# Patient Record
Sex: MALE | Race: BLACK OR AFRICAN AMERICAN | NOT HISPANIC OR LATINO | Employment: STUDENT | URBAN - METROPOLITAN AREA
[De-identification: names, ages, dates, MRNs, and addresses within clinical notes are randomized per-mention and may not be internally consistent; named-entity substitution may affect disease eponyms.]

---

## 2021-08-09 ENCOUNTER — HOSPITAL ENCOUNTER (EMERGENCY)
Facility: HOSPITAL | Age: 21
Discharge: HOME | End: 2021-08-09
Attending: EMERGENCY MEDICINE
Payer: MEDICAID

## 2021-08-09 VITALS
BODY MASS INDEX: 28.79 KG/M2 | HEIGHT: 68 IN | RESPIRATION RATE: 16 BRPM | TEMPERATURE: 99 F | OXYGEN SATURATION: 99 % | HEART RATE: 63 BPM | DIASTOLIC BLOOD PRESSURE: 76 MMHG | WEIGHT: 190 LBS | SYSTOLIC BLOOD PRESSURE: 131 MMHG

## 2021-08-09 DIAGNOSIS — R07.9 CHEST PAIN: ICD-10-CM

## 2021-08-09 LAB
CTP QC/QA: YES
SARS-COV-2 RDRP RESP QL NAA+PROBE: NEGATIVE

## 2021-08-09 PROCEDURE — 99284 EMERGENCY DEPT VISIT MOD MDM: CPT | Mod: 25

## 2021-08-09 PROCEDURE — 99284 EMERGENCY DEPT VISIT MOD MDM: CPT | Mod: CS,,, | Performed by: EMERGENCY MEDICINE

## 2021-08-09 PROCEDURE — 99284 PR EMERGENCY DEPT VISIT,LEVEL IV: ICD-10-PCS | Mod: CS,,, | Performed by: EMERGENCY MEDICINE

## 2021-08-09 PROCEDURE — 93010 EKG 12-LEAD: ICD-10-PCS | Mod: ,,, | Performed by: INTERNAL MEDICINE

## 2021-08-09 PROCEDURE — 93010 ELECTROCARDIOGRAM REPORT: CPT | Mod: ,,, | Performed by: INTERNAL MEDICINE

## 2021-08-09 PROCEDURE — U0002 COVID-19 LAB TEST NON-CDC: HCPCS | Performed by: EMERGENCY MEDICINE

## 2021-08-09 PROCEDURE — 93005 ELECTROCARDIOGRAM TRACING: CPT

## 2021-08-09 RX ORDER — IBUPROFEN 600 MG/1
600 TABLET ORAL EVERY 6 HOURS PRN
Qty: 20 TABLET | Refills: 0 | Status: SHIPPED | OUTPATIENT
Start: 2021-08-09 | End: 2023-04-12

## 2021-12-17 ENCOUNTER — HOSPITAL ENCOUNTER (EMERGENCY)
Facility: HOSPITAL | Age: 21
Discharge: HOME | End: 2021-12-17
Attending: EMERGENCY MEDICINE
Payer: MEDICAID

## 2021-12-17 VITALS
SYSTOLIC BLOOD PRESSURE: 112 MMHG | OXYGEN SATURATION: 98 % | HEART RATE: 59 BPM | WEIGHT: 190 LBS | TEMPERATURE: 99 F | DIASTOLIC BLOOD PRESSURE: 58 MMHG | BODY MASS INDEX: 28.89 KG/M2 | RESPIRATION RATE: 18 BRPM

## 2021-12-17 DIAGNOSIS — R55 SYNCOPE: ICD-10-CM

## 2021-12-17 DIAGNOSIS — R55 VASOVAGAL SYNCOPE: Primary | ICD-10-CM

## 2021-12-17 LAB
ALBUMIN SERPL BCP-MCNC: 3.9 G/DL (ref 3.5–5.2)
ALP SERPL-CCNC: 60 U/L (ref 55–135)
ALT SERPL W/O P-5'-P-CCNC: 8 U/L (ref 10–44)
ANION GAP SERPL CALC-SCNC: 7 MMOL/L (ref 8–16)
AST SERPL-CCNC: 12 U/L (ref 10–40)
BASOPHILS # BLD AUTO: 0.03 K/UL (ref 0–0.2)
BASOPHILS NFR BLD: 0.7 % (ref 0–1.9)
BILIRUB SERPL-MCNC: 0.7 MG/DL (ref 0.1–1)
BUN SERPL-MCNC: 11 MG/DL (ref 6–20)
CALCIUM SERPL-MCNC: 8.7 MG/DL (ref 8.7–10.5)
CHLORIDE SERPL-SCNC: 105 MMOL/L (ref 95–110)
CO2 SERPL-SCNC: 25 MMOL/L (ref 23–29)
CREAT SERPL-MCNC: 0.9 MG/DL (ref 0.5–1.4)
DIFFERENTIAL METHOD: ABNORMAL
EOSINOPHIL # BLD AUTO: 0.1 K/UL (ref 0–0.5)
EOSINOPHIL NFR BLD: 1.8 % (ref 0–8)
ERYTHROCYTE [DISTWIDTH] IN BLOOD BY AUTOMATED COUNT: 11.7 % (ref 11.5–14.5)
EST. GFR  (AFRICAN AMERICAN): >60 ML/MIN/1.73 M^2
EST. GFR  (NON AFRICAN AMERICAN): >60 ML/MIN/1.73 M^2
GLUCOSE SERPL-MCNC: 82 MG/DL (ref 70–110)
HCT VFR BLD AUTO: 40.3 % (ref 40–54)
HGB BLD-MCNC: 13.6 G/DL (ref 14–18)
IMM GRANULOCYTES # BLD AUTO: 0.01 K/UL (ref 0–0.04)
IMM GRANULOCYTES NFR BLD AUTO: 0.2 % (ref 0–0.5)
LYMPHOCYTES # BLD AUTO: 0.8 K/UL (ref 1–4.8)
LYMPHOCYTES NFR BLD: 17.1 % (ref 18–48)
MCH RBC QN AUTO: 30.4 PG (ref 27–31)
MCHC RBC AUTO-ENTMCNC: 33.7 G/DL (ref 32–36)
MCV RBC AUTO: 90 FL (ref 82–98)
MONOCYTES # BLD AUTO: 0.3 K/UL (ref 0.3–1)
MONOCYTES NFR BLD: 6.8 % (ref 4–15)
NEUTROPHILS # BLD AUTO: 3.2 K/UL (ref 1.8–7.7)
NEUTROPHILS NFR BLD: 73.4 % (ref 38–73)
NRBC BLD-RTO: 0 /100 WBC
PLATELET # BLD AUTO: 165 K/UL (ref 150–450)
PMV BLD AUTO: 11.9 FL (ref 9.2–12.9)
POTASSIUM SERPL-SCNC: 4 MMOL/L (ref 3.5–5.1)
PROT SERPL-MCNC: 6.7 G/DL (ref 6–8.4)
RBC # BLD AUTO: 4.48 M/UL (ref 4.6–6.2)
SODIUM SERPL-SCNC: 137 MMOL/L (ref 136–145)
TROPONIN I SERPL DL<=0.01 NG/ML-MCNC: <0.006 NG/ML (ref 0–0.03)
WBC # BLD AUTO: 4.38 K/UL (ref 3.9–12.7)

## 2021-12-17 PROCEDURE — 87389 HIV-1 AG W/HIV-1&-2 AB AG IA: CPT | Performed by: EMERGENCY MEDICINE

## 2021-12-17 PROCEDURE — 93010 EKG 12-LEAD: ICD-10-PCS | Mod: ,,, | Performed by: INTERNAL MEDICINE

## 2021-12-17 PROCEDURE — 93005 ELECTROCARDIOGRAM TRACING: CPT

## 2021-12-17 PROCEDURE — 93010 ELECTROCARDIOGRAM REPORT: CPT | Mod: ,,, | Performed by: INTERNAL MEDICINE

## 2021-12-17 PROCEDURE — 80053 COMPREHEN METABOLIC PANEL: CPT | Performed by: STUDENT IN AN ORGANIZED HEALTH CARE EDUCATION/TRAINING PROGRAM

## 2021-12-17 PROCEDURE — 85025 COMPLETE CBC W/AUTO DIFF WBC: CPT | Performed by: STUDENT IN AN ORGANIZED HEALTH CARE EDUCATION/TRAINING PROGRAM

## 2021-12-17 PROCEDURE — 99285 EMERGENCY DEPT VISIT HI MDM: CPT | Mod: 25

## 2021-12-17 PROCEDURE — 84484 ASSAY OF TROPONIN QUANT: CPT | Performed by: STUDENT IN AN ORGANIZED HEALTH CARE EDUCATION/TRAINING PROGRAM

## 2021-12-17 PROCEDURE — 99284 PR EMERGENCY DEPT VISIT,LEVEL IV: ICD-10-PCS | Mod: ,,, | Performed by: EMERGENCY MEDICINE

## 2021-12-17 PROCEDURE — 99284 EMERGENCY DEPT VISIT MOD MDM: CPT | Mod: ,,, | Performed by: EMERGENCY MEDICINE

## 2021-12-17 PROCEDURE — 86803 HEPATITIS C AB TEST: CPT | Performed by: EMERGENCY MEDICINE

## 2021-12-18 ENCOUNTER — TELEPHONE (OUTPATIENT)
Dept: EMERGENCY MEDICINE | Facility: HOSPITAL | Age: 21
End: 2021-12-18
Payer: MEDICAID

## 2021-12-18 DIAGNOSIS — R55 SYNCOPE, UNSPECIFIED SYNCOPE TYPE: Primary | ICD-10-CM

## 2021-12-20 LAB
HCV AB SERPL QL IA: NEGATIVE
HIV 1+2 AB+HIV1 P24 AG SERPL QL IA: NEGATIVE

## 2022-02-14 ENCOUNTER — OFFICE VISIT (OUTPATIENT)
Dept: CARDIOLOGY | Facility: CLINIC | Age: 22
End: 2022-02-14
Payer: MEDICAID

## 2022-02-14 VITALS
HEART RATE: 76 BPM | BODY MASS INDEX: 27.17 KG/M2 | DIASTOLIC BLOOD PRESSURE: 72 MMHG | HEIGHT: 68 IN | SYSTOLIC BLOOD PRESSURE: 126 MMHG | OXYGEN SATURATION: 99 % | WEIGHT: 179.25 LBS

## 2022-02-14 DIAGNOSIS — R55 VASOVAGAL SYNCOPE: Primary | ICD-10-CM

## 2022-02-14 DIAGNOSIS — R55 SYNCOPE, UNSPECIFIED SYNCOPE TYPE: ICD-10-CM

## 2022-02-14 PROCEDURE — 3074F SYST BP LT 130 MM HG: CPT | Mod: CPTII,,, | Performed by: INTERNAL MEDICINE

## 2022-02-14 PROCEDURE — 99999 PR PBB SHADOW E&M-EST. PATIENT-LVL III: ICD-10-PCS | Mod: PBBFAC,,, | Performed by: INTERNAL MEDICINE

## 2022-02-14 PROCEDURE — 1159F MED LIST DOCD IN RCRD: CPT | Mod: CPTII,,, | Performed by: INTERNAL MEDICINE

## 2022-02-14 PROCEDURE — 1160F RVW MEDS BY RX/DR IN RCRD: CPT | Mod: CPTII,,, | Performed by: INTERNAL MEDICINE

## 2022-02-14 PROCEDURE — 93005 ELECTROCARDIOGRAM TRACING: CPT | Mod: PBBFAC,PN | Performed by: INTERNAL MEDICINE

## 2022-02-14 PROCEDURE — 3074F PR MOST RECENT SYSTOLIC BLOOD PRESSURE < 130 MM HG: ICD-10-PCS | Mod: CPTII,,, | Performed by: INTERNAL MEDICINE

## 2022-02-14 PROCEDURE — 99205 OFFICE O/P NEW HI 60 MIN: CPT | Mod: S$PBB,25,, | Performed by: INTERNAL MEDICINE

## 2022-02-14 PROCEDURE — 99213 OFFICE O/P EST LOW 20 MIN: CPT | Mod: PBBFAC,PN | Performed by: INTERNAL MEDICINE

## 2022-02-14 PROCEDURE — 3008F BODY MASS INDEX DOCD: CPT | Mod: CPTII,,, | Performed by: INTERNAL MEDICINE

## 2022-02-14 PROCEDURE — 99999 PR PBB SHADOW E&M-EST. PATIENT-LVL III: CPT | Mod: PBBFAC,,, | Performed by: INTERNAL MEDICINE

## 2022-02-14 PROCEDURE — 93010 ELECTROCARDIOGRAM REPORT: CPT | Mod: S$PBB,,, | Performed by: INTERNAL MEDICINE

## 2022-02-14 PROCEDURE — 1160F PR REVIEW ALL MEDS BY PRESCRIBER/CLIN PHARMACIST DOCUMENTED: ICD-10-PCS | Mod: CPTII,,, | Performed by: INTERNAL MEDICINE

## 2022-02-14 PROCEDURE — 99205 PR OFFICE/OUTPT VISIT, NEW, LEVL V, 60-74 MIN: ICD-10-PCS | Mod: S$PBB,25,, | Performed by: INTERNAL MEDICINE

## 2022-02-14 PROCEDURE — 3008F PR BODY MASS INDEX (BMI) DOCUMENTED: ICD-10-PCS | Mod: CPTII,,, | Performed by: INTERNAL MEDICINE

## 2022-02-14 PROCEDURE — 3078F DIAST BP <80 MM HG: CPT | Mod: CPTII,,, | Performed by: INTERNAL MEDICINE

## 2022-02-14 PROCEDURE — 93010 EKG 12-LEAD: ICD-10-PCS | Mod: S$PBB,,, | Performed by: INTERNAL MEDICINE

## 2022-02-14 PROCEDURE — 1159F PR MEDICATION LIST DOCUMENTED IN MEDICAL RECORD: ICD-10-PCS | Mod: CPTII,,, | Performed by: INTERNAL MEDICINE

## 2022-02-14 PROCEDURE — 3078F PR MOST RECENT DIASTOLIC BLOOD PRESSURE < 80 MM HG: ICD-10-PCS | Mod: CPTII,,, | Performed by: INTERNAL MEDICINE

## 2022-02-14 NOTE — PROGRESS NOTES
"  Subjective:      Patient ID: Chelsea Pack II is a 21 y.o. male.    Chief Complaint: Loss of Consciousness (ER referral)    HPI:  Pt felt weak while sitting in his room smoking.  Pt arose to let his father know that he was feeling bad and collapsed unconscious.  After waking up pt arose again and fainted again.  EMS did a fingerstick blood test and pt fainted again.  "My blood pressure was very low"    Pt fainted another time about a year ago after cutting his finger while cooking.    Pt is generally active.    When he plays basketball he feels a little short of breath.    Pt works as a .    Pt also fainted once when he was stuck with accupuncture needles      Review of Systems   Cardiovascular: Positive for dyspnea on exertion and syncope. Negative for chest pain, claudication, irregular heartbeat, leg swelling, near-syncope, orthopnea and palpitations.        Past Medical History:   Diagnosis Date    Asthma     Syncope and collapse         Past Surgical History:   Procedure Laterality Date    knee tendon surgery         Family History   Problem Relation Age of Onset    No Known Problems Mother     No Known Problems Father        Social History     Socioeconomic History    Marital status: Single   Tobacco Use    Smoking status: Never Smoker    Smokeless tobacco: Never Used   Substance and Sexual Activity    Alcohol use: No    Drug use: No       Current Outpatient Medications on File Prior to Visit   Medication Sig Dispense Refill    ibuprofen (ADVIL,MOTRIN) 600 MG tablet Take 1 tablet (600 mg total) by mouth every 6 (six) hours as needed for Pain. 20 tablet 0     No current facility-administered medications on file prior to visit.       Review of patient's allergies indicates:   Allergen Reactions    Amoxicillin Swelling     Objective:     Vitals:    02/14/22 1026   BP: 126/72   BP Location: Left arm   Patient Position: Sitting   BP Method: Large (Automatic)   Pulse: 76   SpO2: 99%   Weight: " "81.3 kg (179 lb 3.7 oz)   Height: 5' 8" (1.727 m)        Physical Exam  Constitutional:       General: He is not in acute distress.     Appearance: He is well-developed and well-nourished. He is not diaphoretic.   Eyes:      General: No scleral icterus.  Neck:      Vascular: No carotid bruit or JVD.   Cardiovascular:      Rate and Rhythm: Regular rhythm.      Pulses:           Dorsalis pedis pulses are 2+ on the right side and 2+ on the left side.      Heart sounds: Normal heart sounds. No murmur heard.  No friction rub. No gallop.    Pulmonary:      Effort: Pulmonary effort is normal. No respiratory distress.      Breath sounds: Normal breath sounds.   Abdominal:      Palpations: Abdomen is soft. There is no hepatomegaly, splenomegaly, mass or pulsatile mass.   Musculoskeletal:         General: No edema.      Right lower leg: No edema.      Left lower leg: No edema.   Skin:     General: Skin is warm and dry.   Neurological:      Mental Status: He is alert and oriented to person, place, and time.   Psychiatric:         Mood and Affect: Mood and affect normal.         Behavior: Behavior normal.         Thought Content: Thought content normal.         Judgment: Judgment normal.              ECG today reviewed by me:  GIANNA SAMSON    Admission on 12/17/2021, Discharged on 12/17/2021   Component Date Value Ref Range Status    HIV 1/2 Ag/Ab 12/17/2021 Negative  Negative Final    Hepatitis C Ab 12/17/2021 Negative  Negative Final    WBC 12/17/2021 4.38  3.90 - 12.70 K/uL Final    RBC 12/17/2021 4.48* 4.60 - 6.20 M/uL Final    Hemoglobin 12/17/2021 13.6* 14.0 - 18.0 g/dL Final    Hematocrit 12/17/2021 40.3  40.0 - 54.0 % Final    MCV 12/17/2021 90  82 - 98 fL Final    MCH 12/17/2021 30.4  27.0 - 31.0 pg Final    MCHC 12/17/2021 33.7  32.0 - 36.0 g/dL Final    RDW 12/17/2021 11.7  11.5 - 14.5 % Final    Platelets 12/17/2021 165  150 - 450 K/uL Final    MPV 12/17/2021 11.9  9.2 - 12.9 fL Final    Immature " Granulocytes 12/17/2021 0.2  0.0 - 0.5 % Final    Gran # (ANC) 12/17/2021 3.2  1.8 - 7.7 K/uL Final    Immature Grans (Abs) 12/17/2021 0.01  0.00 - 0.04 K/uL Final    Lymph # 12/17/2021 0.8* 1.0 - 4.8 K/uL Final    Mono # 12/17/2021 0.3  0.3 - 1.0 K/uL Final    Eos # 12/17/2021 0.1  0.0 - 0.5 K/uL Final    Baso # 12/17/2021 0.03  0.00 - 0.20 K/uL Final    nRBC 12/17/2021 0  0 /100 WBC Final    Gran % 12/17/2021 73.4* 38.0 - 73.0 % Final    Lymph % 12/17/2021 17.1* 18.0 - 48.0 % Final    Mono % 12/17/2021 6.8  4.0 - 15.0 % Final    Eosinophil % 12/17/2021 1.8  0.0 - 8.0 % Final    Basophil % 12/17/2021 0.7  0.0 - 1.9 % Final    Differential Method 12/17/2021 Automated   Final    Sodium 12/17/2021 137  136 - 145 mmol/L Final    Potassium 12/17/2021 4.0  3.5 - 5.1 mmol/L Final    Chloride 12/17/2021 105  95 - 110 mmol/L Final    CO2 12/17/2021 25  23 - 29 mmol/L Final    Glucose 12/17/2021 82  70 - 110 mg/dL Final    BUN 12/17/2021 11  6 - 20 mg/dL Final    Creatinine 12/17/2021 0.9  0.5 - 1.4 mg/dL Final    Calcium 12/17/2021 8.7  8.7 - 10.5 mg/dL Final    Total Protein 12/17/2021 6.7  6.0 - 8.4 g/dL Final    Albumin 12/17/2021 3.9  3.5 - 5.2 g/dL Final    Total Bilirubin 12/17/2021 0.7  0.1 - 1.0 mg/dL Final    Alkaline Phosphatase 12/17/2021 60  55 - 135 U/L Final    AST 12/17/2021 12  10 - 40 U/L Final    ALT 12/17/2021 8* 10 - 44 U/L Final    Anion Gap 12/17/2021 7* 8 - 16 mmol/L Final    eGFR if African American 12/17/2021 >60.0  >60 mL/min/1.73 m^2 Final    eGFR if non African American 12/17/2021 >60.0  >60 mL/min/1.73 m^2 Final    Troponin I 12/17/2021 <0.006  0.000 - 0.026 ng/mL Final   (        Order: [id number]   Status: Final result     Visible to patient: No (inaccessible in Patient Portal)     Next appt: None     Dx: Chest pain     0 Result Notes           Narrative  Performed by: GEMUSE  Test Reason : R07.9,     Vent. Rate : 063 BPM     Atrial Rate : 063 BPM      P-R Int :  164 ms          QRS Dur : 106 ms       QT Int : 374 ms       P-R-T Axes : 078 056 049 degrees      QTc Int : 382 ms     Normal sinus rhythm   Normal ECG   No previous ECGs available   Confirmed by RAMONITA NEWMAN MD ([id number]) on 8/10/2021 2:24:55 PM     Referred By: MARIOLA    SELF           Confirmed By:RAMONITA NEWMAN MD      Specimen Collected: 08/09/21 21:05 Last Resulted: 08/10/21 14:24                 Show images for EKG 12-lead      EKG 12-lead  Order: [id number]  · Status: Final result   · Visible to patient: No (inaccessible in Patient Portal)   · Next appt: None   · Dx: Syncope    · 0 Result Notes           Narrative  Performed by: GEMUSE  Test Reason : R55,     Vent. Rate : 092 BPM     Atrial Rate : 092 BPM      P-R Int : 200 ms          QRS Dur : 104 ms       QT Int : 326 ms       P-R-T Axes : 076 024 044 degrees      QTc Int : 403 ms     Normal sinus rhythm   Right ventricular conduction delay   Borderline Abnormal ECG   When compared with ECG of 09-AUG-2021 21:05,   RSR' pattern in V1 is now Present   Confirmed by Zach Sullivan MD ([id number]) on 12/18/2021 8:50:59 AM     Referred By: MARIOLA    SELF           Confirmed By:Zach Sullivan MD      Specimen Collected: 12/17/21 15:27           · inaccessible in Patient Portal)   · Next appt: None   · 0 Result Notes    Details    Reading Physician Reading Date Result Priority   Stefan Elizabeth MD  [phone]  [phone] 12/17/2021 STAT     Narrative & Impression  EXAMINATION:  XR CHEST AP PORTABLE     CLINICAL HISTORY:  syncope workup, abnormal ekg;     TECHNIQUE:  Single frontal view of the chest was performed.     COMPARISON:  08/09/2021     FINDINGS:  The lungs are clear, with normal appearance of pulmonary vasculature and no pleural effusion or pneumothorax.     The cardiac silhouette is normal in size. The hilar and mediastinal contours are unremarkable.     Bones are intact.     Impression:     No acute abnormality.        Electronically signed by: Stefan  Gema  Date:                                            12/17/2021  Time:                                           18:10             Exam Ended: 12/17/21 17:30 Last Resulted: 12/17/21 18:10                     Assessment:     1. Vasovagal syncope    2. Syncope, unspecified syncope type      Plan:   Chelsea was seen today for loss of consciousness.    Diagnoses and all orders for this visit:    Vasovagal syncope    Syncope, unspecified syncope type  -     Ambulatory referral/consult to Cardiology  -     IN OFFICE EKG 12-LEAD (to Muse)  -     Holter monitor - 48 hour; Future  -     Echo Saline Bubble? No; Future     Pt has vasovagal syncope.    Pt and mother reassured.  Further reassured that there is no evidence of Brugada syndrome which was a question raised by ER MD.    Will obtain a 48 hour holter and echocardiogram    Pt cautioned to lie down if he feel weak and to arise very slowly after he feels better..    I instructed pt to inform phlebotomist of his tendency to faint and to lie supine if necessary.    RTC prn    No follow-ups on file.

## 2022-03-03 ENCOUNTER — TELEPHONE (OUTPATIENT)
Dept: CARDIOLOGY | Facility: CLINIC | Age: 22
End: 2022-03-03
Payer: MEDICAID

## 2022-03-08 ENCOUNTER — TELEPHONE (OUTPATIENT)
Dept: CARDIOLOGY | Facility: CLINIC | Age: 22
End: 2022-03-08
Payer: MEDICAID

## 2022-09-10 ENCOUNTER — HOSPITAL ENCOUNTER (EMERGENCY)
Facility: HOSPITAL | Age: 22
Discharge: HOME | End: 2022-09-10
Attending: EMERGENCY MEDICINE
Payer: MEDICAID

## 2022-09-10 VITALS
BODY MASS INDEX: 27.17 KG/M2 | TEMPERATURE: 98 F | OXYGEN SATURATION: 100 % | HEIGHT: 68 IN | HEART RATE: 64 BPM | RESPIRATION RATE: 16 BRPM | DIASTOLIC BLOOD PRESSURE: 75 MMHG | WEIGHT: 179.25 LBS | SYSTOLIC BLOOD PRESSURE: 132 MMHG

## 2022-09-10 DIAGNOSIS — R55 VASOVAGAL SYNCOPE: Primary | ICD-10-CM

## 2022-09-10 DIAGNOSIS — M25.561 RIGHT KNEE PAIN: ICD-10-CM

## 2022-09-10 LAB
ALBUMIN SERPL BCP-MCNC: 4.3 G/DL (ref 3.5–5.2)
ALP SERPL-CCNC: 65 U/L (ref 55–135)
ALT SERPL W/O P-5'-P-CCNC: 24 U/L (ref 10–44)
ANION GAP SERPL CALC-SCNC: 9 MMOL/L (ref 8–16)
AST SERPL-CCNC: 23 U/L (ref 10–40)
BASOPHILS # BLD AUTO: 0.03 K/UL (ref 0–0.2)
BASOPHILS NFR BLD: 0.7 % (ref 0–1.9)
BILIRUB SERPL-MCNC: 0.6 MG/DL (ref 0.1–1)
BUN SERPL-MCNC: 10 MG/DL (ref 6–20)
CALCIUM SERPL-MCNC: 9.4 MG/DL (ref 8.7–10.5)
CHLORIDE SERPL-SCNC: 104 MMOL/L (ref 95–110)
CO2 SERPL-SCNC: 26 MMOL/L (ref 23–29)
CREAT SERPL-MCNC: 1 MG/DL (ref 0.5–1.4)
DIFFERENTIAL METHOD: ABNORMAL
EOSINOPHIL # BLD AUTO: 0.1 K/UL (ref 0–0.5)
EOSINOPHIL NFR BLD: 3.4 % (ref 0–8)
ERYTHROCYTE [DISTWIDTH] IN BLOOD BY AUTOMATED COUNT: 12.1 % (ref 11.5–14.5)
EST. GFR  (NO RACE VARIABLE): >60 ML/MIN/1.73 M^2
GLUCOSE SERPL-MCNC: 85 MG/DL (ref 70–110)
HCT VFR BLD AUTO: 39.6 % (ref 40–54)
HGB BLD-MCNC: 13.9 G/DL (ref 14–18)
IMM GRANULOCYTES # BLD AUTO: 0.02 K/UL (ref 0–0.04)
IMM GRANULOCYTES NFR BLD AUTO: 0.5 % (ref 0–0.5)
LYMPHOCYTES # BLD AUTO: 0.7 K/UL (ref 1–4.8)
LYMPHOCYTES NFR BLD: 17.8 % (ref 18–48)
MCH RBC QN AUTO: 31 PG (ref 27–31)
MCHC RBC AUTO-ENTMCNC: 35.1 G/DL (ref 32–36)
MCV RBC AUTO: 88 FL (ref 82–98)
MONOCYTES # BLD AUTO: 0.5 K/UL (ref 0.3–1)
MONOCYTES NFR BLD: 11.2 % (ref 4–15)
NEUTROPHILS # BLD AUTO: 2.7 K/UL (ref 1.8–7.7)
NEUTROPHILS NFR BLD: 66.4 % (ref 38–73)
NRBC BLD-RTO: 0 /100 WBC
PLATELET # BLD AUTO: 164 K/UL (ref 150–450)
PMV BLD AUTO: 11.4 FL (ref 9.2–12.9)
POCT GLUCOSE: 83 MG/DL (ref 70–110)
POTASSIUM SERPL-SCNC: 4.3 MMOL/L (ref 3.5–5.1)
PROT SERPL-MCNC: 7.1 G/DL (ref 6–8.4)
RBC # BLD AUTO: 4.49 M/UL (ref 4.6–6.2)
SODIUM SERPL-SCNC: 139 MMOL/L (ref 136–145)
TSH SERPL DL<=0.005 MIU/L-ACNC: 1.07 UIU/ML (ref 0.4–4)
WBC # BLD AUTO: 4.1 K/UL (ref 3.9–12.7)

## 2022-09-10 PROCEDURE — 99284 EMERGENCY DEPT VISIT MOD MDM: CPT | Mod: ,,, | Performed by: EMERGENCY MEDICINE

## 2022-09-10 PROCEDURE — 63600175 PHARM REV CODE 636 W HCPCS: Performed by: STUDENT IN AN ORGANIZED HEALTH CARE EDUCATION/TRAINING PROGRAM

## 2022-09-10 PROCEDURE — 93010 EKG 12-LEAD: ICD-10-PCS | Mod: ,,, | Performed by: INTERNAL MEDICINE

## 2022-09-10 PROCEDURE — 99284 PR EMERGENCY DEPT VISIT,LEVEL IV: ICD-10-PCS | Mod: ,,, | Performed by: EMERGENCY MEDICINE

## 2022-09-10 PROCEDURE — 93010 ELECTROCARDIOGRAM REPORT: CPT | Mod: ,,, | Performed by: INTERNAL MEDICINE

## 2022-09-10 PROCEDURE — 84443 ASSAY THYROID STIM HORMONE: CPT | Performed by: STUDENT IN AN ORGANIZED HEALTH CARE EDUCATION/TRAINING PROGRAM

## 2022-09-10 PROCEDURE — 96360 HYDRATION IV INFUSION INIT: CPT

## 2022-09-10 PROCEDURE — 80053 COMPREHEN METABOLIC PANEL: CPT | Performed by: STUDENT IN AN ORGANIZED HEALTH CARE EDUCATION/TRAINING PROGRAM

## 2022-09-10 PROCEDURE — 93005 ELECTROCARDIOGRAM TRACING: CPT

## 2022-09-10 PROCEDURE — 99285 EMERGENCY DEPT VISIT HI MDM: CPT | Mod: 25

## 2022-09-10 PROCEDURE — 85025 COMPLETE CBC W/AUTO DIFF WBC: CPT | Performed by: STUDENT IN AN ORGANIZED HEALTH CARE EDUCATION/TRAINING PROGRAM

## 2022-09-10 PROCEDURE — 82962 GLUCOSE BLOOD TEST: CPT

## 2022-09-10 RX ADMIN — SODIUM CHLORIDE, SODIUM LACTATE, POTASSIUM CHLORIDE, AND CALCIUM CHLORIDE 1000 ML: .6; .31; .03; .02 INJECTION, SOLUTION INTRAVENOUS at 02:09

## 2022-09-10 NOTE — ED PROVIDER NOTES
Encounter Date: 9/10/2022       History     Chief Complaint   Patient presents with    Loss of Consciousness     Patient is a 21 y/o M w/ PMHx of vasovagal syncope presents for a recurrent syncopal event today.  Endorses prodromal symptoms, including nausea, diaphoresis, tingling/numbness of bilateral hands and tunnel vision.  The syncopal event comes 1 hour after he smoked marijuana.  This event was witnessed by his father, who denied any head trauma or seizure-like activity. Unclear how long he was unconscious for.  Denied confusion afterwards. Denied chest pain, SOB, current N/V/, abdominal pain, headaches, or changes in vision.    Review of patient's allergies indicates:   Allergen Reactions    Amoxicillin Swelling     Past Medical History:   Diagnosis Date    Asthma     Syncope and collapse      Past Surgical History:   Procedure Laterality Date    knee tendon surgery       Family History   Problem Relation Age of Onset    No Known Problems Mother     No Known Problems Father      Social History     Tobacco Use    Smoking status: Never    Smokeless tobacco: Never   Substance Use Topics    Alcohol use: No    Drug use: No     Review of Systems   Constitutional:  Positive for diaphoresis (now resolved). Negative for activity change, chills and fever.   HENT:  Negative for congestion, ear pain and sore throat.    Respiratory:  Negative for shortness of breath and stridor.    Cardiovascular:  Negative for chest pain and palpitations.   Gastrointestinal:  Positive for nausea (now resolved) and vomiting (now resolved). Negative for abdominal pain.   Genitourinary:  Negative for dysuria and urgency.   Musculoskeletal:  Negative for back pain.   Skin:  Negative for rash.   Neurological:  Positive for syncope. Negative for dizziness, weakness and headaches.   Hematological:  Does not bruise/bleed easily.     Physical Exam     Initial Vitals   BP Pulse Resp Temp SpO2   09/10/22 1252 09/10/22 1252 09/10/22 1252 09/10/22  1252 09/10/22 1317   120/86 (!) 56 14 97.8 °F (36.6 °C) 100 %      MAP       --                Physical Exam    Nursing note and vitals reviewed.  Constitutional: He appears well-developed and well-nourished. He is not diaphoretic. No distress.   Well-appearing.  Speaking full sentences.  No acute distress.   HENT:   Head: Normocephalic and atraumatic.   Right Ear: External ear normal.   Left Ear: External ear normal.   Neck: Neck supple.   Cardiovascular:  Normal rate, regular rhythm, S1 normal, S2 normal, normal heart sounds and intact distal pulses.           No murmur heard.  Pulmonary/Chest: Breath sounds normal. No respiratory distress. He has no wheezes. He has no rhonchi. He has no rales.   Abdominal: Abdomen is soft. He exhibits no distension. There is no abdominal tenderness. There is no rebound and no guarding.   Musculoskeletal:      Cervical back: Neck supple.      Comments: F ROM to right knee.  RLE is neurovascularly intact.  No obvious bony deformities.     Neurological: He is alert and oriented to person, place, and time. GCS score is 15. GCS eye subscore is 4. GCS verbal subscore is 5. GCS motor subscore is 6.   Intact motor and sensation to upper and lower extremities bilaterally.   Skin: Skin is warm. Capillary refill takes less than 2 seconds. No rash noted.   Psychiatric: He has a normal mood and affect.       ED Course   Procedures  Labs Reviewed   CBC W/ AUTO DIFFERENTIAL - Abnormal; Notable for the following components:       Result Value    RBC 4.49 (*)     Hemoglobin 13.9 (*)     Hematocrit 39.6 (*)     Lymph # 0.7 (*)     Lymph % 17.8 (*)     All other components within normal limits   COMPREHENSIVE METABOLIC PANEL    Narrative:     Add on TSH Per Dr Marta Castillo Order#[id number] on 9/10/22 @1351   TSH   TSH    Narrative:     Add on TSH Per Dr Marta Castillo Order#[id number] on 9/10/22 @1351   POCT GLUCOSE MONITORING CONTINUOUS     EKG Readings: (Independently Interpreted)   Initial  "Reading: No STEMI. Previous EKG: Compared with most recent EKG Previous EKG Date: 02/14/2022. Rhythm: Sinus Bradycardia. Heart Rate: 58. Ectopy: No Ectopy. Conduction: 1st Degree AV Block.   Isolated T-wave inversion in lead 3     Imaging Results              X-Ray Knee 1 or 2 View Right (Final result)  Result time 09/10/22 14:58:55      Final result by Berna Mayo MD (09/10/22 14:58:55)                   Impression:      1. Patellar enthesophyte at the patellar tendon insertion site which appears fragmented, a change from the 2016 exam.  This is most likely related to history of prior patellar tendon surgery described in the 07/22/2016 operative note.  However acute avulsion injury cannot be excluded.  Correlate with trauma history.  2. Stable osteochondroma of the proximal fibula.      Electronically signed by: Berna Mayo MD  Date:    09/10/2022  Time:    14:58               Narrative:    EXAMINATION:  XR KNEE 1 OR 2 VIEW RIGHT    CLINICAL HISTORY:  Pain in right knee    TECHNIQUE:  AP and lateral views of the right knee were performed.    COMPARISON:  Prior dated 05/23/2016    FINDINGS:  There is no dislocation, or bone destruction.  There is a stable appearing osteochondroma of the proximal fibular metaphysis.  There is patellar enthesophyte formation at the patellar tendon insertion site.  This appears fragmented.                                       Medications   lactated ringers bolus 1,000 mL (0 mLs Intravenous Stopped 9/10/22 1510)     Medical Decision Making:   History:   Old Medical Records: I decided to obtain old medical records.  Old Records Summarized: records from clinic visits and records from previous admission(s).       <> Summary of Records: 12/2021: Seen in ED for v/v syncope: " Patient reports he was smoking marijuana when he became lightheaded, dizzy and subsequently passed out.  The episode was witnessed by his father.  The patient did not fall or hit his head.  His father " "called EMS and upon arrival they noted heart rate to be in the 40s, administered 1 mg of atropine."    Seen by Cards for f/u: "Pt and mother reassured.  Further reassured that there is no evidence of Brugada syndrome which was a question raised by ER MD. Will obtain a 48 hour holter and echocardiogram"    2/2022: Echo neg  3/2022: Holter neg  Initial Assessment:   Emergent evaluation of a syncopal event. He is afebrile and hemodynamically stable.  Differential Diagnosis:   Vasovagal syncope, unlikely seizure, electrolyte abnormality, dehydration/orthostasis  Clinical Tests:   Lab Tests: Ordered and Reviewed  Radiological Study: Ordered and Reviewed  Medical Tests: Ordered and Reviewed  ED Management:  Labs are unremarkable.  EKG with isolated T-wave inversion, discussed with cardiology--no concern at this time for cardiogenic etiology. Given 1 L IVF. XR of right knee done per patient's request. Per official read, there is a patellar enthesophyte that appears fragmented-- however patient is currently asymptomatic and ambulatory in ED without difficulty. Placed ambulatory referral to Orthopedic surgery.  Discussed ED return precautions, and he expressed understanding.          Attending Attestation:   Physician Attestation Statement for Resident:  As the supervising MD   Physician Attestation Statement: I have personally seen and examined this patient.   I agree with the above history.  -:   As the supervising MD I agree with the above PE.   -: NAD, NCAT, A&Ox3, PERRL and EOMI, neck supple/normal ROM, CTAB, RRR no mrg, normal extremity ROM/m/s, abd s/nt/nd, no LE edema, skin dry and warm     As the supervising MD I agree with the above treatment, course, plan, and disposition.   -: Pt well-appearing, endorses prodromal symptoms after becoming lightheaded shortly after smoking marijuana then standing up, no reported trauma. Do not suspect arrhythmia given prodrome, no seizure activity witnessed by father nor tongue " lac/incontinence, but will obtain basic labs.    I have reviewed and agree with the residents interpretation of the following: lab data and EKG.  I have reviewed the following: old records at this facility.                       Clinical Impression:   Final diagnoses:  [M25.561] Right knee pain  [R55] Vasovagal syncope (Primary)        ED Disposition Condition    Discharge Stable          ED Prescriptions    None       Follow-up Information    None          Norman Thorpe MD  Resident  09/10/22 1531       Marta Castillo MD  09/11/22 1022

## 2022-09-10 NOTE — ED NOTES
Pt c/o passing out after getting out of bed. Pt states he was lying in bed watching tv, felt as if he was going to pass out, and got up to go tell his dad. Pt states he then passed out and fell on the floor. Pt states he did not hit his head and not really sure if he really passed out. His left arm and leg locked up and felt funny. Pt then transferred via ems. Pt states this is the 5th time this has happened. Last time was in May. Pt recently diagnosed with vasovagal syncope.

## 2022-09-10 NOTE — ED NOTES
Adult Physical Assessment  LOC: Chelsea Pack II, 22 y.o. male verified via two identifiers.  The patient is awake, alert, oriented and speaking appropriately at this time.  APPEARANCE: Patient resting comfortably and appears to be in no acute distress at this time. Patient is clean and well groomed, patient's clothing is properly fastened.  SKIN:The skin is warm and dry, color consistent with ethnicity, patient has normal skin turgor and moist mucus membranes, skin intact, no breakdown or brusing noted.  MUSCULOSKELETAL: Patient moving all extremities well, no obvious swelling or deformities noted. Patient c/o right knee pain from playing basketball the other day.  RESPIRATORY: Airway is open and patent, respirations are spontaneous, patient has a normal effort and rate, no accessory muscle use noted.  CARDIAC: Patient has a normal rate and rhythm, no periphreal edema noted in any extremity, capillary refill < 3 seconds in all extremities  ABDOMEN: Soft and non tender to palpation, no abdominal distention noted. Bowel sounds present in all four quadrants.  NEUROLOGIC: Eyes open spontaneously, behavior appropriate to situation, follows commands, facial expression symmetrical, bilateral hand grasp equal and even, purposeful motor response noted, normal sensation in all extremities when touched with a finger.

## 2022-09-10 NOTE — PROVIDER PROGRESS NOTES - EMERGENCY DEPT.
"Encounter Date: 9/10/2022    ED Physician Progress Notes        Physician Note:   ED Resident HAND-OFF NOTE:  2:10 PM 9/10/2022  Chelsea Pack II is a 22 y.o. male who presented to the ED on 9/10/2022 and has been managed by Dr. Castillo and Dr. Thorpe, who reports patient C/O LOC. I assumed care of patient from off-going ED physician team at 2:10 PM pending TSH lab.    On my evaluation, Chelsea Pack II is well appearing, hemodynamically stable, and in NAD. Thus far, Chelsea Pack II has received:  Medications  lactated ringers bolus 1,000 mL (1,000 mLs Intravenous New Bag 9/10/22 1411)    On my exam, I appreciate:  /86   Pulse (!) 56   Temp 97.8 °F (36.6 °C) (Oral)   Resp 14   Ht 5' 8" (1.727 m)   Wt 81.3 kg (179 lb 3.7 oz)   BMI 27.25 kg/m²       Additional ED course:  - TSH lab is WNL    Disposition: Discharge to home  I have discussed and counseled Chelsea Pack II regarding exam, results, diagnosis, treatment, and plan.  ______________________  Amilcar Cruz MD   Emergency Medicine Resident  9/10/2022      "

## 2023-03-30 ENCOUNTER — TELEPHONE (OUTPATIENT)
Dept: PRIMARY CARE CLINIC | Facility: CLINIC | Age: 23
End: 2023-03-30
Payer: MEDICAID

## 2023-03-30 NOTE — TELEPHONE ENCOUNTER
----- Message from Berna Villafuerte sent at 3/30/2023  1:44 PM CDT -----  Regarding: new patient  Name of caller: Chelsea       What is the requesting detail: pt Is interested in becoming a new patient, please give him a call back to let him know if this is a possibility or not.       Can the clinic reply by [facility]: yes       What number to call back:[phone]

## 2023-04-12 ENCOUNTER — OFFICE VISIT (OUTPATIENT)
Dept: PRIMARY CARE CLINIC | Facility: CLINIC | Age: 23
End: 2023-04-12
Payer: MEDICAID

## 2023-04-12 ENCOUNTER — OFFICE VISIT (OUTPATIENT)
Dept: CARDIOLOGY | Facility: CLINIC | Age: 23
End: 2023-04-12
Payer: MEDICAID

## 2023-04-12 VITALS
WEIGHT: 205.38 LBS | HEART RATE: 71 BPM | BODY MASS INDEX: 31.13 KG/M2 | SYSTOLIC BLOOD PRESSURE: 162 MMHG | HEIGHT: 68 IN | DIASTOLIC BLOOD PRESSURE: 67 MMHG | OXYGEN SATURATION: 99 %

## 2023-04-12 VITALS
HEIGHT: 68 IN | HEART RATE: 84 BPM | WEIGHT: 205 LBS | OXYGEN SATURATION: 99 % | BODY MASS INDEX: 31.07 KG/M2 | SYSTOLIC BLOOD PRESSURE: 130 MMHG | DIASTOLIC BLOOD PRESSURE: 70 MMHG

## 2023-04-12 DIAGNOSIS — R55 VASOVAGAL SYNCOPE: Primary | ICD-10-CM

## 2023-04-12 DIAGNOSIS — I44.1 SECOND DEGREE MOBITZ I AV BLOCK: ICD-10-CM

## 2023-04-12 DIAGNOSIS — R55 VASOVAGAL SYNCOPE: ICD-10-CM

## 2023-04-12 DIAGNOSIS — M54.12 CERVICAL RADICULOPATHY: ICD-10-CM

## 2023-04-12 PROCEDURE — 1160F RVW MEDS BY RX/DR IN RCRD: CPT | Mod: CPTII,,, | Performed by: NURSE PRACTITIONER

## 2023-04-12 PROCEDURE — 3078F DIAST BP <80 MM HG: CPT | Mod: CPTII,,, | Performed by: NURSE PRACTITIONER

## 2023-04-12 PROCEDURE — 3078F PR MOST RECENT DIASTOLIC BLOOD PRESSURE < 80 MM HG: ICD-10-PCS | Mod: CPTII,,, | Performed by: FAMILY MEDICINE

## 2023-04-12 PROCEDURE — 3077F SYST BP >= 140 MM HG: CPT | Mod: CPTII,,, | Performed by: NURSE PRACTITIONER

## 2023-04-12 PROCEDURE — 99999 PR PBB SHADOW E&M-EST. PATIENT-LVL V: CPT | Mod: PBBFAC,,, | Performed by: FAMILY MEDICINE

## 2023-04-12 PROCEDURE — 3008F PR BODY MASS INDEX (BMI) DOCUMENTED: ICD-10-PCS | Mod: CPTII,,, | Performed by: NURSE PRACTITIONER

## 2023-04-12 PROCEDURE — 93010 EKG 12-LEAD: ICD-10-PCS | Mod: S$PBB,,, | Performed by: INTERNAL MEDICINE

## 2023-04-12 PROCEDURE — 1159F PR MEDICATION LIST DOCUMENTED IN MEDICAL RECORD: ICD-10-PCS | Mod: CPTII,,, | Performed by: NURSE PRACTITIONER

## 2023-04-12 PROCEDURE — 1160F RVW MEDS BY RX/DR IN RCRD: CPT | Mod: CPTII,,, | Performed by: FAMILY MEDICINE

## 2023-04-12 PROCEDURE — 3008F BODY MASS INDEX DOCD: CPT | Mod: CPTII,,, | Performed by: FAMILY MEDICINE

## 2023-04-12 PROCEDURE — 1159F PR MEDICATION LIST DOCUMENTED IN MEDICAL RECORD: ICD-10-PCS | Mod: CPTII,,, | Performed by: FAMILY MEDICINE

## 2023-04-12 PROCEDURE — 99215 OFFICE O/P EST HI 40 MIN: CPT | Mod: S$PBB,,, | Performed by: NURSE PRACTITIONER

## 2023-04-12 PROCEDURE — 1159F MED LIST DOCD IN RCRD: CPT | Mod: CPTII,,, | Performed by: FAMILY MEDICINE

## 2023-04-12 PROCEDURE — 3008F PR BODY MASS INDEX (BMI) DOCUMENTED: ICD-10-PCS | Mod: CPTII,,, | Performed by: FAMILY MEDICINE

## 2023-04-12 PROCEDURE — 3077F PR MOST RECENT SYSTOLIC BLOOD PRESSURE >= 140 MM HG: ICD-10-PCS | Mod: CPTII,,, | Performed by: NURSE PRACTITIONER

## 2023-04-12 PROCEDURE — 3078F PR MOST RECENT DIASTOLIC BLOOD PRESSURE < 80 MM HG: ICD-10-PCS | Mod: CPTII,,, | Performed by: NURSE PRACTITIONER

## 2023-04-12 PROCEDURE — 99204 OFFICE O/P NEW MOD 45 MIN: CPT | Mod: S$PBB,,, | Performed by: FAMILY MEDICINE

## 2023-04-12 PROCEDURE — 93005 ELECTROCARDIOGRAM TRACING: CPT | Mod: PBBFAC,PN | Performed by: INTERNAL MEDICINE

## 2023-04-12 PROCEDURE — 1159F MED LIST DOCD IN RCRD: CPT | Mod: CPTII,,, | Performed by: NURSE PRACTITIONER

## 2023-04-12 PROCEDURE — 99999 PR PBB SHADOW E&M-EST. PATIENT-LVL IV: CPT | Mod: PBBFAC,,, | Performed by: NURSE PRACTITIONER

## 2023-04-12 PROCEDURE — 1160F PR REVIEW ALL MEDS BY PRESCRIBER/CLIN PHARMACIST DOCUMENTED: ICD-10-PCS | Mod: CPTII,,, | Performed by: FAMILY MEDICINE

## 2023-04-12 PROCEDURE — 3078F DIAST BP <80 MM HG: CPT | Mod: CPTII,,, | Performed by: FAMILY MEDICINE

## 2023-04-12 PROCEDURE — 99999 PR PBB SHADOW E&M-EST. PATIENT-LVL IV: ICD-10-PCS | Mod: PBBFAC,,, | Performed by: NURSE PRACTITIONER

## 2023-04-12 PROCEDURE — 93010 ELECTROCARDIOGRAM REPORT: CPT | Mod: S$PBB,,, | Performed by: INTERNAL MEDICINE

## 2023-04-12 PROCEDURE — 3075F SYST BP GE 130 - 139MM HG: CPT | Mod: CPTII,,, | Performed by: FAMILY MEDICINE

## 2023-04-12 PROCEDURE — 99999 PR PBB SHADOW E&M-EST. PATIENT-LVL V: ICD-10-PCS | Mod: PBBFAC,,, | Performed by: FAMILY MEDICINE

## 2023-04-12 PROCEDURE — 99204 PR OFFICE/OUTPT VISIT, NEW, LEVL IV, 45-59 MIN: ICD-10-PCS | Mod: S$PBB,,, | Performed by: FAMILY MEDICINE

## 2023-04-12 PROCEDURE — 99215 OFFICE O/P EST HI 40 MIN: CPT | Mod: PBBFAC,27,PN | Performed by: FAMILY MEDICINE

## 2023-04-12 PROCEDURE — 99214 OFFICE O/P EST MOD 30 MIN: CPT | Mod: 25,PBBFAC,PN | Performed by: NURSE PRACTITIONER

## 2023-04-12 PROCEDURE — 3075F PR MOST RECENT SYSTOLIC BLOOD PRESS GE 130-139MM HG: ICD-10-PCS | Mod: CPTII,,, | Performed by: FAMILY MEDICINE

## 2023-04-12 PROCEDURE — 99215 PR OFFICE/OUTPT VISIT, EST, LEVL V, 40-54 MIN: ICD-10-PCS | Mod: S$PBB,,, | Performed by: NURSE PRACTITIONER

## 2023-04-12 PROCEDURE — 1160F PR REVIEW ALL MEDS BY PRESCRIBER/CLIN PHARMACIST DOCUMENTED: ICD-10-PCS | Mod: CPTII,,, | Performed by: NURSE PRACTITIONER

## 2023-04-12 PROCEDURE — 3008F BODY MASS INDEX DOCD: CPT | Mod: CPTII,,, | Performed by: NURSE PRACTITIONER

## 2023-04-12 RX ORDER — OLOPATADINE HYDROCHLORIDE 1 MG/ML
1 SOLUTION/ DROPS OPHTHALMIC 2 TIMES DAILY
COMMUNITY
Start: 2023-02-09

## 2023-04-12 RX ORDER — IBUPROFEN 800 MG/1
800 TABLET ORAL 3 TIMES DAILY
Qty: 90 TABLET | Refills: 2 | Status: SHIPPED | OUTPATIENT
Start: 2023-04-12 | End: 2023-05-12

## 2023-04-12 RX ORDER — CETIRIZINE HYDROCHLORIDE 5 MG/1
5 TABLET ORAL
COMMUNITY

## 2023-04-12 NOTE — PROGRESS NOTES
Subjective     Patient ID: Chelsea Pack II is a 22 y.o. male.    Chief Complaint: Annual Exam    Loss of Consciousness  This is a chronic (3 episodes of syncope related to hypoglycemia) problem. The current episode started more than 1 year ago. The problem occurs every several days. The problem has been unchanged. He lost consciousness for a period of less than 1 minute. Associated symptoms include an auditory change, diaphoresis, light-headedness and weakness (right hand). Pertinent negatives include no nausea, palpitations or visual change. He has tried position change for the symptoms. His past medical history is significant for HTN.   Neck Pain   This is a new problem. Episode onset: 2 months. The problem has been gradually worsening. The pain is present in the right side. The quality of the pain is described as burning. The pain is at a severity of 7/10. The symptoms are aggravated by twisting. Associated symptoms include numbness (RUE), syncope and weakness (right hand). Pertinent negatives include no visual change.   Review of Systems   Constitutional:  Positive for diaphoresis.   Cardiovascular:  Positive for syncope. Negative for palpitations.   Gastrointestinal:  Negative for nausea.   Musculoskeletal:  Positive for neck pain.   Neurological:  Positive for weakness (right hand), light-headedness and numbness (RUE).        Objective     Physical Exam  Constitutional:       Appearance: Normal appearance. He is well-developed.   HENT:      Head: Normocephalic.      Mouth/Throat:      Pharynx: No oropharyngeal exudate.   Neck:      Thyroid: No thyromegaly.   Cardiovascular:      Rate and Rhythm: Normal rate and regular rhythm.      Heart sounds: Normal heart sounds. No murmur heard.  Pulmonary:      Effort: Pulmonary effort is normal.      Breath sounds: Normal breath sounds. No wheezing or rales.   Abdominal:      General: Bowel sounds are normal.      Palpations: Abdomen is soft. There is no mass.       Tenderness: There is no abdominal tenderness.   Genitourinary:     Penis: No tenderness.       Prostate: Normal.      Rectum: Normal.   Musculoskeletal:      Cervical back: Neck supple. No tenderness.      Right lower leg: No edema.      Left lower leg: No edema.   Lymphadenopathy:      Cervical: No cervical adenopathy.      Upper Body:      Right upper body: No supraclavicular adenopathy.      Left upper body: No supraclavicular adenopathy.   Skin:     General: Skin is warm and dry.      Findings: No rash.   Neurological:      General: No focal deficit present.      Mental Status: He is alert and oriented to person, place, and time.      Motor: No weakness.          Assessment and Plan     Problem List Items Addressed This Visit          Cardiac/Vascular    Vasovagal syncope - Primary    Relevant Orders    Ambulatory referral/consult to Cardiology     Other Visit Diagnoses       Second degree Mobitz I AV block        Relevant Orders    Ambulatory referral/consult to Cardiology    Cervical radiculopathy        Relevant Medications    ibuprofen (ADVIL,MOTRIN) 800 MG tablet    Other Relevant Orders    X-Ray Cervical Spine AP And Lateral    Ambulatory referral/consult to Physical/Occupational Therapy        Chelsea was seen today for annual exam.    Diagnoses and all orders for this visit:    Vasovagal syncope  We will referred to Cardiology for further evaluation since patient continues to have frequent symptoms.  May benefit from tilt testing  -     Ambulatory referral/consult to Cardiology; Future    Second degree Mobitz I AV block  Brief episodes noted on Holter monitor  -     Ambulatory referral/consult to Cardiology; Future    Cervical radiculopathy  Patient with worsening radiculopathy involving the right upper extremity.  Will obtain x-rays and refer to physical therapy  -     X-Ray Cervical Spine AP And Lateral; Future  -     Ambulatory referral/consult to Physical/Occupational Therapy; Future  -     ibuprofen  (ADVIL,MOTRIN) 800 MG tablet; Take 1 tablet (800 mg total) by mouth 3 (three) times daily.

## 2023-04-12 NOTE — PROGRESS NOTES
"        Cardiology    4/12/2023  11:38 AM    Problem list  Patient Active Problem List   Diagnosis    Knee pain, bilateral    Right knee pain    Patellar tendinitis    Pain    Vasovagal syncope       CC:  Syncope    HPI:  Has problems with syncopal episodes.  Has been able to manage them by sitting down and raising his feet.  He has been seen by Dr. Barba in the past- he is new to me. His PCP was concerned as they are still happening.  Episodes occur every other day. If he bends over to pick something up he gets lightheaded.  If he does not sit down and raise his legs it takes over. It will happen when just sitting in a chair. Usually he will get really dizzy and hot and once he knows he is fully going out he can't hear anymore and knows to call his family to call the ER. He was told that he has problems getting oxygen naturally by EMS staff- last episode of passing out was ~ 6 months ago.,  Has been told it is due to hypoglycemia.  Episodes commonly happen in the morning. He will have 2-3 cups of coffee upon awakening and will not eat until 1030 or so. He can still feel the episodes coming on and is able to catch them but the etiology is still unknown.  Has family history of HTN. Will have syncope with finger sticks, anything dealing with blood or needles can contribute to episodes. The first episode he remembers occurring is from cutting his finger.  Drinks on weekends- has had to "catch himself" more after these events, felt it coming on but did not pass out completely. No HTN in past, only episodes of low BP.  He also had asthma in the past- he has problems catching his breath when active.     Medications  Current Outpatient Medications   Medication Sig Dispense Refill    ibuprofen (ADVIL,MOTRIN) 800 MG tablet Take 1 tablet (800 mg total) by mouth 3 (three) times daily. 90 tablet 2    olopatadine (PATANOL) 0.1 % ophthalmic solution Place 1 drop into both eyes 2 (two) times daily.      cetirizine (ZYRTEC) 5 MG " tablet Take 5 mg by mouth as needed for Allergies.       No current facility-administered medications for this visit.      Prior to Admission medications    Medication Sig Start Date End Date Taking? Authorizing Provider   ibuprofen (ADVIL,MOTRIN) 800 MG tablet Take 1 tablet (800 mg total) by mouth 3 (three) times daily. 23 Yes Michelle Muro MD   olopatadine (PATANOL) 0.1 % ophthalmic solution Place 1 drop into both eyes 2 (two) times daily. 23  Yes Historical Provider   cetirizine (ZYRTEC) 5 MG tablet Take 5 mg by mouth as needed for Allergies.    Historical Provider   ibuprofen (ADVIL,MOTRIN) 600 MG tablet Take 1 tablet (600 mg total) by mouth every 6 (six) hours as needed for Pain. 21  Jose Reed MD         History  Past Medical History:   Diagnosis Date    Asthma     Syncope and collapse      Past Surgical History:   Procedure Laterality Date    knee tendon surgery       Social History     Socioeconomic History    Marital status: Single   Tobacco Use    Smoking status: Former     Types: Cigars     Start date:      Quit date: 2022     Years since quittin.3    Smokeless tobacco: Never   Substance and Sexual Activity    Alcohol use: No    Drug use: Yes     Types: Marijuana         Allergies  Review of patient's allergies indicates:   Allergen Reactions    Amoxicillin Swelling         Review of Systems   Review of Systems   Constitutional: Negative for diaphoresis and malaise/fatigue.   HENT: Negative.     Cardiovascular:  Positive for near-syncope. Negative for chest pain, claudication, dyspnea on exertion, irregular heartbeat, leg swelling, orthopnea, palpitations, paroxysmal nocturnal dyspnea and syncope.   Respiratory:  Negative for shortness of breath.    Endocrine: Negative for polydipsia, polyphagia and polyuria.   Hematologic/Lymphatic: Does not bruise/bleed easily.   Gastrointestinal:  Negative for bloating, nausea and vomiting.   Genitourinary: Negative.     Neurological:  Positive for light-headedness. Negative for excessive daytime sleepiness, dizziness, loss of balance and weakness.   Psychiatric/Behavioral:  The patient is not nervous/anxious.    Allergic/Immunologic: Negative.        Physical Exam  Wt Readings from Last 1 Encounters:   04/12/23 93.2 kg (205 lb 5.7 oz)     BP Readings from Last 3 Encounters:   04/12/23 (!) 162/67   04/12/23 130/70   09/10/22 132/75     Pulse Readings from Last 1 Encounters:   04/12/23 71     Body mass index is 31.22 kg/m².    Physical Exam  Vitals and nursing note reviewed.   Constitutional:       Appearance: Normal appearance.   HENT:      Head: Normocephalic and atraumatic.      Mouth/Throat:      Mouth: Mucous membranes are moist.   Eyes:      Pupils: Pupils are equal, round, and reactive to light.   Cardiovascular:      Rate and Rhythm: Normal rate and regular rhythm.      Pulses:           Radial pulses are 2+ on the right side and 2+ on the left side.        Dorsalis pedis pulses are 2+ on the right side and 2+ on the left side.        Posterior tibial pulses are 2+ on the right side and 2+ on the left side.      Heart sounds: No murmur heard.  Pulmonary:      Effort: Pulmonary effort is normal. No respiratory distress.      Breath sounds: Normal breath sounds.   Abdominal:      General: There is no distension.      Tenderness: There is no abdominal tenderness.   Musculoskeletal:      Cervical back: Normal range of motion.      Right lower leg: No edema.      Left lower leg: No edema.   Skin:     General: Skin is warm and dry.      Findings: No erythema.   Neurological:      General: No focal deficit present.      Mental Status: He is alert.   Psychiatric:         Mood and Affect: Mood normal.         Behavior: Behavior normal.       Problem List Items Addressed This Visit          Cardiac/Vascular    Vasovagal syncope    Overview     Had unrevealing Holter in past  Able to prevent full syncopal episodes with lying down    Check baseline labs  Repeat Holter to assess for arrhythmia  Recommend regular meals as episodes tend to happen in morning after being awake several hours before eating           Current Assessment & Plan     As above           Relevant Orders    IN OFFICE EKG 12-LEAD (to Muse)    Holter monitor - 48 hour    TSH    COMPREHENSIVE METABOLIC PANEL    CBC W/ AUTO DIFFERENTIAL     Other Visit Diagnoses       Second degree Mobitz I AV block        Relevant Orders    IN OFFICE EKG 12-LEAD (to Muse)          EKG normal in clinic, does have AV block noted on previous EKG        Follow Up  2 weeks, VV      @Michaela Grajeda DNP

## 2023-04-25 ENCOUNTER — TELEPHONE (OUTPATIENT)
Dept: CARDIOLOGY | Facility: CLINIC | Age: 23
End: 2023-04-25
Payer: MEDICAID

## 2023-04-25 NOTE — TELEPHONE ENCOUNTER
Left voicemail asking patient to return call with appointment date that he would prefer, an appointment is scheduled for SAÚL Grajeda on 5/5/23 @ 1 PM and an appointment on hold on 5/9/23 @ 1 PM.  Call back number supplied on voicemail.

## 2023-05-09 ENCOUNTER — TELEPHONE (OUTPATIENT)
Dept: CARDIOLOGY | Facility: CLINIC | Age: 23
End: 2023-05-09
Payer: MEDICAID

## 2023-05-09 NOTE — TELEPHONE ENCOUNTER
Left voicemail for patient offering to reschedule missed cardiology appointment, call back number supplied on voicemail.